# Patient Record
Sex: MALE | Race: WHITE
[De-identification: names, ages, dates, MRNs, and addresses within clinical notes are randomized per-mention and may not be internally consistent; named-entity substitution may affect disease eponyms.]

---

## 2023-05-22 ENCOUNTER — HOSPITAL ENCOUNTER (INPATIENT)
Dept: HOSPITAL 97 - ER | Age: 38
LOS: 1 days | DRG: 296 | End: 2023-05-23
Attending: HOSPITALIST | Admitting: HOSPITALIST
Payer: SELF-PAY

## 2023-05-22 VITALS — BODY MASS INDEX: 26.9 KG/M2

## 2023-05-22 DIAGNOSIS — J81.1: ICD-10-CM

## 2023-05-22 DIAGNOSIS — J96.01: ICD-10-CM

## 2023-05-22 DIAGNOSIS — K72.00: ICD-10-CM

## 2023-05-22 DIAGNOSIS — R68.0: ICD-10-CM

## 2023-05-22 DIAGNOSIS — R57.9: ICD-10-CM

## 2023-05-22 DIAGNOSIS — E87.20: ICD-10-CM

## 2023-05-22 DIAGNOSIS — I45.10: ICD-10-CM

## 2023-05-22 DIAGNOSIS — Z79.899: ICD-10-CM

## 2023-05-22 DIAGNOSIS — J96.02: ICD-10-CM

## 2023-05-22 DIAGNOSIS — I46.9: Primary | ICD-10-CM

## 2023-05-22 DIAGNOSIS — N17.9: ICD-10-CM

## 2023-05-22 LAB
ALBUMIN SERPL BCP-MCNC: 2.5 G/DL (ref 3.4–5)
ALP SERPL-CCNC: 91 U/L (ref 45–117)
ALT SERPL W P-5'-P-CCNC: 734 U/L (ref 16–61)
AST SERPL W P-5'-P-CCNC: 885 U/L (ref 15–37)
BILIRUB INDIRECT SERPL-MCNC: (no result) MG/DL (ref 0.2–0.8)
BUN BLD-MCNC: 28 MG/DL (ref 7–18)
COHGB MFR BLDA: 0 % (ref 0–1.5)
GLUCOSE SERPLBLD-MCNC: 357 MG/DL (ref 74–106)
HCT VFR BLD CALC: 46.8 % (ref 39.6–49)
INR BLD: 1.14
LYMPHOCYTES # SPEC AUTO: 1.4 K/UL (ref 0.7–4.9)
MAGNESIUM SERPL-MCNC: 3.9 MG/DL (ref 1.6–2.4)
MCV RBC: 107.3 FL (ref 80–100)
OXYHGB MFR BLDA: 42.9 % (ref 94–97)
PMV BLD: 9.7 FL (ref 7.6–11.3)
POTASSIUM SERPL-SCNC: 4.1 MEQ/L (ref 3.5–5.1)
RBC # BLD: 4.36 M/UL (ref 4.33–5.43)
SAO2 % BLDA: 43.4 % (ref 92–98.5)
TROPONIN I SERPL HS-MCNC: 29.3 PG/ML (ref ?–58.9)

## 2023-05-22 PROCEDURE — 5A12012 PERFORMANCE OF CARDIAC OUTPUT, SINGLE, MANUAL: ICD-10-PCS

## 2023-05-22 PROCEDURE — 51702 INSERT TEMP BLADDER CATH: CPT

## 2023-05-22 PROCEDURE — 99291 CRITICAL CARE FIRST HOUR: CPT

## 2023-05-22 PROCEDURE — 87205 SMEAR GRAM STAIN: CPT

## 2023-05-22 PROCEDURE — 82805 BLOOD GASES W/O2 SATURATION: CPT

## 2023-05-22 PROCEDURE — 93005 ELECTROCARDIOGRAM TRACING: CPT

## 2023-05-22 PROCEDURE — 92950 HEART/LUNG RESUSCITATION CPR: CPT

## 2023-05-22 PROCEDURE — 71045 X-RAY EXAM CHEST 1 VIEW: CPT

## 2023-05-22 PROCEDURE — 83605 ASSAY OF LACTIC ACID: CPT

## 2023-05-22 PROCEDURE — 84484 ASSAY OF TROPONIN QUANT: CPT

## 2023-05-22 PROCEDURE — 31500 INSERT EMERGENCY AIRWAY: CPT

## 2023-05-22 PROCEDURE — 94002 VENT MGMT INPAT INIT DAY: CPT

## 2023-05-22 PROCEDURE — 80048 BASIC METABOLIC PNL TOTAL CA: CPT

## 2023-05-22 PROCEDURE — 87040 BLOOD CULTURE FOR BACTERIA: CPT

## 2023-05-22 PROCEDURE — 36415 COLL VENOUS BLD VENIPUNCTURE: CPT

## 2023-05-22 PROCEDURE — 83735 ASSAY OF MAGNESIUM: CPT

## 2023-05-22 PROCEDURE — 80076 HEPATIC FUNCTION PANEL: CPT

## 2023-05-22 PROCEDURE — 85025 COMPLETE CBC W/AUTO DIFF WBC: CPT

## 2023-05-22 PROCEDURE — 85610 PROTHROMBIN TIME: CPT

## 2023-05-22 PROCEDURE — 99292 CRITICAL CARE ADDL 30 MIN: CPT

## 2023-05-22 NOTE — RAD REPORT
EXAM DESCRIPTION:  RAD - Chest Single View - 5/22/2023 7:45 pm

 

CLINICAL HISTORY:  cpr

Chest pain.

 

COMPARISON:  No comparisons

 

FINDINGS:  Portable technique limits examination quality.

 

Tip of the endotracheal tube is at the level of the aortic arch. Enteric tube tip is just entering th
e stomach. Right-sided venous catheter its tip in the SVC. No postprocedure pneumothorax.

 

Moderate bilateral pulmonary opacities noted. Sternotomy wires are present. The heart is mildly enlar
ged in size. Nondisplaced fracture evident.
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
## 2023-05-22 NOTE — ER
Nurse's Notes                                                                                     

 [facility]                                                                 

Name: Emre Ramires                                                                                 

Age: 38 yrs                                                                                       

Sex: Male                                                                                         

: 1985                                                                                   

MRN: [id number]                                                                                   

Arrival Date: 2023                                                                          

Time: 18:50                                                                                       

Account#: [id number]                                                                            

Bed 3                                                                                             

Private MD:                                                                                       

Diagnosis: Respiratory failure, hypoxia, acute cardiac arrest, lactic acidosis, acute brain death,

  cerebral hypoxia, multiple organ failure, acute renal failure, shock liver                      

                                                                                                  

Presentation:                                                                                     

                                                                                             

18:51 Chief complaint: EMS states: "pt found on toilet without pulse by girlfriend around     mb9 

      1820. CPR initiated. Gave 2 rounds of Epi in route, 2 Atropine, and 1 Bicarb at 1838.".     

18:51 Coronavirus screen:. Ebola Screen: No symptoms or risks identified at this time.        mb9 

      Initial Sepsis Screen:. Risk Assessment: Do you want to hurt yourself or someone else?      

      Unable to obtain. Onset of symptoms was May 22, 2023.                                       

18:51 Method Of Arrival: EMS: [facility]                                                    mb9 

18:51 Acuity: JAG 1                                                                           mb9 

18:51 Compressions began prior to arrival.                                                    mb9 

18:51 Care prior to arrival: Assisted ventilation, CPR Medication(s) given: 2 Epi, 2          mb9 

      Atropine, and Bicarb.                                                                       

                                                                                                  

Triage Assessment:                                                                                

18:51 General: Behavior is unresponsive.                                                      mb9 

18:51 Respiratory: Airway is compromised. GI: Abdomen is round distended. Derm: Skin is dry,  mb9 

      Skin is pale, Skin temperature is cool.                                                     

                                                                                                  

Historical:                                                                                       

- Allergies:                                                                                      

19:25 No Known Allergies;                                                                     aa5 

19:26 Unable to obtain;                                                                       mb9 

- Home Meds:                                                                                      

19:25 divalproex oral [Active]; oxcarbazepine oral [Active]; Hydroxyzine Oral [Active];       aa5 

      olanzapine oral [Active];                                                                   

19:26 Unable to obtain [Active];                                                              mb9 

- PMHx:                                                                                           

19:25 seizures; Anxiety; hiatal hernia;                                                       aa5 

19:26 Anxiety; Seizure;                                                                       mb9 

- PSHx:                                                                                           

19:26 Unable to Obtain;                                                                       mb9 

                                                                                                  

- Immunization history:: Adult Immunizations unknown.                                             

- Social history:: Smoking status: unknown Patient uses marijuana .                               

                                                                                                  

                                                                                                  

Assessment:                                                                                       

18:42 Reassessment: Rhythm PEA. No pulse present. CPR resumed.                                mb9 

18:44 Reassessment: Rhythm PEA. No pulse present. CPR resumed.                                mb9 

18:46 Reassessment: Rhythm PEA. No central pulse present. CPR resumed.                        mb9 

18:48 Reassessment: Rhythm PEA. No central pulse present. CPR resumed.                        mb9 

18:50 Reassessment: Central pulse palpated. Pulse 62 bpm. Rhythm normal sinus rhythm.         mb9 

19:03 Reassessment: No central pulse present. Rhythm PEA. CPR resumed.                        mb9 

19:05 Reassessment: Rhythm PEA. No central pulse present. CPR resumed.                        mb9 

19:07 Reassessment: Rhythm PEA, No central pulses palpated. CPR resumed.                      mb9 

19:09 Reassessment: Reassessment: Rhythm PEA, No central pulses palpated. CPR resumed.        mb9 

19:11 Reassessment: Central pulse palpated.                                                   mb9 

19:27 Reassessment: Dr. Bal speaking to pt's girlfriend at this time, pt's girlfriend       aa5 

      reports they were here on vacation from [place], pt's girlfriend states "he complained     

      about his stomach hurting and throwing up since yesterday and today he was on the           

      toilet trying to have a bowel movement when all of a sudden we heard a loud thump and       

      found him on the floor having a seizure I think so we called 911". Pt's girlfriend          

      reports she started CPR prior to EMS scene arrival. Reports pt only drinks ETOH             

      occasionally and uses marijuana daily. .                                                    

19:35 General: Behavior is Smells of. Neuro: Level of Consciousness is intubated. Pupils are  mb9 

      fixed, dilated, non-reactive. Cardiovascular: Rhythm is regular. GI: Oral gastric tube      

      to suction. secretions from OG tube reddish/black. Derm: Skin is dry, Skin is pale,         

      Skin temperature is cool.                                                                   

20:55 Reassessment: Girlfriend at bedside. Dr. Atwood speaking to .                      mb9 

21:51 Reassessment: Contacted house supervisor about needed Vasopressin for pt.               mb9 

23:51 Reassessment: No changes from previously documented assessment. Patient and/or family   mb9 

      updated on plan of care and expected duration. Pain level reassessed.                       

                                                                                                  

Vital Signs:                                                                                      

18:43  / 96; Pulse Ox 70% on ETT ambu;                                                  vc1 

18:50 BP 57 / 39; Pulse 62; Resp 24; Pulse Ox 70% ;                                           vc1 

19:00 BP 86 / 54; Pulse 60; Resp 24; Pulse Ox 66% on ETT ambu;                                vc1 

19:03 BP 92 / 68; Pulse Ox 79% on ETT ambu;                                                   vc1 

19:11 BP 65 / 37; Pulse 60; Pulse Ox 75% on ETT ambu;                                         vc1 

19:30 BP 51 / 34; Pulse 62; Resp 18; Temp 89.7(R); Pulse Ox 71% on ETT vent;                  vc1 

19:35 Weight 70 kg; Height 5 ft. 10 in. ;                                                     mb9 

19:45 BP 48 / 39; Pulse 67; Resp 18; Pulse Ox 69% on ETT vent;                                vc1 

20:00 BP 53 / 37; Pulse 65; Resp 18; Pulse Ox 72% on ETT vent;                                vc1 

20:15 BP 60 / 47; Pulse 71; Resp 18; Temp 87.1(Ca); Pulse Ox 77% on ETT vent;                 vc1 

20:30 BP 67 / 53; Pulse 71; Resp 18; Temp 87.1; Pulse Ox 80% on ETT vent;                     vc1 

20:45 BP 63 / 50; Pulse 74; Resp 18; Temp 86.9(Ca); Pulse Ox 80% on ETT vent;                 vc1 

21:00 BP 65 / 55; Pulse 76; Resp 18; Temp 86.8; Pulse Ox 81% on ETT vent;                     vc1 

21:30 BP 70 / 57; Pulse 79; Resp 16; Temp 86.7(Ca); Pulse Ox 81% on ETT vent;                 vc1 

21:45 BP 82 / 63; Pulse 81; Resp 14; Temp 86.6; Pulse Ox 81% on ETT vent;                     vc1 

21:52 BP 82 / 63; Pulse 84; Resp 18; Temp 96.7; Pulse Ox 82% ;                                mb9 

22:04 BP 68 / 57; Pulse 78; Resp 18; Temp 86.7(Ca); Pulse Ox 82% on ETT vent;                 vc1 

22:07 BP 87 / 66; Pulse 87; Resp 18; Temp 86.8; Pulse Ox 81% on ETT vent;                     vc1 

22:15 BP 54 / 65; Pulse 89; Resp 18; Temp 87; Pulse Ox 77% on ETT vent;                       vc1 

22:30 BP 79 / 62; Pulse 93; Resp 18 A; Temp 87.4(Ca); Pulse Ox 74% on ETT vent;               vc1 

23:51 BP 90 / 76; Pulse 104; Resp 18; Pulse Ox 61% on 100% FiO2 ETT vent;                     mb9 

19:35 Body Mass Index 22.14 (70.00 kg, 177.8 cm)                                              mb9 

18:43 PEA on monitor                                                                          vc1 

18:50 ROSC                                                                                    vc1 

19:03 PEA                                                                                     vc1 

19:11 ROSC                                                                                    vc1 

                                                                                                  

[place] Coma Score:                                                                               

22:10 Eye Response: none(1). Motor Response: none(1). Verbal Response: none(1). Total: 3.     sp4 

                                                                                                  

ED Course:                                                                                        

18:45 Arm band placed on.                                                                     mb9 

18:46 Intubation: Ventilated with 100% bag valve mask (BVM) prior to procedure. 7.5 Fr. ETT   mb9 

      placed orally. Performed by Keegan Bal MD Successful on first attempt. Placement           

      verified by auscultating bilateral breath sounds, Ventilated with Ambu bag. 23 cm at        

      teeth.                                                                                      

18:50 Inserted saline lock: 20 gauge in right forearm, using aseptic technique.               mb9 

18:51 Patient arrived in ED.                                                                  aa5 

18:56 Marie cath inserted, using sterile technique, 16 Fr., by ED staff, balloon inflated, to mb9 

      gravity drainage.                                                                           

18:56 14 fr OG tube placed.                                                                   mb9 

19:00 Assisted provider with central line placement. Set up central line tray. Triple lumen   mb9 

      line placed in right internal jugular. Line placed by Keegan Bal MD Placement verified     

      by blood return, Dressed with Tegaderm, Blood was collected.                                

19:17 Samia Horavth, RN is Primary Nurse.                                               mb9 

19:25 Keegan Bal MD is Attending Physician.                                                sp3 

19:26 Triage completed.                                                                       mb9 

19:47 XRAY Chest (1 view) In Process Unspecified.                                             EDMS

20:06 Attending Physician role handed off by Keegan Bal MD                                 sp4 

20:06 Ruben Atwood MD is Attending Physician.                                           sp4 

22:17 Derek Chung MD is Hospitalizing Provider.                                           sp4 

                                                                                                  

Administered Medications:                                                                         

18:43 Drug: EPINEPHrine 1:10,000 IVP 0.1 mg/kg Route: IVP; Site: right antecubital;           mb9 

18:44 Drug: Sodium Bicarbonate IVP 1 amp Route: IVP; Site: right antecubital;                 mb9 

18:45 Drug: D50W IVP 50 ml Route: IVP; Site: right antecubital;                               mb9 

18:45 Drug: Naloxone IVP 2 mg Route: IVP; Site: right antecubital;                            mb9 

18:47 Drug: EPINEPHrine 1:10,000 IVP 1 mg Route: IVP; Site: right antecubital;                mb9 

18:50 Drug: Sodium Bicarbonate IVP 1 amp Route: IVP; Site: right antecubital;                 mb9 

18:57 Drug: EPINEPHrine 1:10,000 IVP 1 mg Route: IVP; Site: right antecubital;                mb9 

19:04 Drug: EPINEPHrine 1:10,000 IVP 1 mg Route: IVP; Site: right jugular;                    mb9 

19:08 Drug: Calcium Chloride IVP 1 grams Route: IVP; Site: right forearm;                     mb9 

19:09 Drug: Sodium Bicarbonate IVP 1 amp Route: IVP; Site: right forearm;                     mb9 

19:11 Drug: Calcium Chloride IVP 1 grams Route: IVP; Site: right forearm;                     mb9 

19:12 Drug: Sodium Bicarbonate IVP 1 amp Route: IVP; Site: right forearm;                     mb9 

19:15 Drug: Norepinephrine IV 0.1 mcg/kg/min Route: IV; Rate: calculated rate; Site: right    mb9 

      jugular;                                                                                    

19:23 Follow up: Response: No adverse reaction; Rate change 30 mcg/kg/min                     mb9 

21:37 Follow up: Response: No adverse reaction; IV Status: Completed infusion                 mb9 

19:39 CANCELLED (Other Intervention Used): EPINEPHrine 1:10,000 Endotracheal 1 mg             mb9 

      Endotracheal once                                                                           

19:50 Drug: DOPamine 0.5 mcg/kg/min Route: IV; Rate: calculated rate; Site: right forearm;    mb9 

21:37 Follow up: Response: No adverse reaction; IV Status: Completed infusion                 mb9 

21:41 Follow up: Response: No adverse reaction; IV Status: Completed infusion                 mb9 

20:07 CANCELLED (Other Intervention Used): DOBUTamine IV (500 mg/250mL premix) 2.5 mcg/kg/min kl  

      IV at calculated rate in right jugular See Administration Instructions; Recommended max     

      rate 20 mcg/kg/min; Titrate 2.5 mcg/kg/min as often as every 5 minutes to achieve goal      

      (see titration policy); Goal parameter MAP greater than 65 mmHg.                            

21:38 Drug: Norepinephrine IV 0.1 mcg/kg/min Route: IV; Rate: calculated rate; Site: right    mb9 

      jugular;                                                                                    

23:41 Follow up: Increase rate to 45 mcg/kg/min per Dr. Bradshaw                             mb9 

21:39 Drug: DOPamine 0.5 mcg/kg/min Route: IV; Rate: mcg/kg/min; Site: right jugular;         mb9 

21:51 Drug: Sodium Bicarbonate IVP 2 amp Route: IVP; Site: right antecubital;                 mb9 

23:00 Drug: Albumin IVPB 25 grams Volume: 100 ml; Route: IVPB; Site: right forearm;           mb9 

23:05 Drug: Vasopressin IV 0.02 units/min Route: IV; Rate: calculated rate; Site: right       mb9 

      jugular;                                                                                    

                                                                                                  

                                                                                                  

Intake:                                                                                           

                                                                                                  

Outcome:                                                                                          

22:18 Decision to Hospitalize by Provider.                                                    sp4 

23:52 Admitted to ICU accompanied by nurse, room 6, with oxygen, on monitor, with chart,      mb9 

      Report called to  HADLEY Pizano                                                               

23:52 Condition: stable                                                                           

23:52 Instructed on the need for admit.                                                           

                                                                                             

00:18 Patient left the ED.                                                                    vc1 

                                                                                                  

Signatures:                                                                                       

Dispatcher [employer]                           EDMS                                                 

Deborah Miller RN                     RN   aa5                                                  

Keegan Bal MD MD   sp3                                                  

Melyssa Yun RN RN   vc1                                                  

Samia Horvath RN RN   mb9                                                  

Ruben Atwood MD MD   sp4                                                  

Joleen King RN   kl                                                   

                                                                                                  

Corrections: (The following items were deleted from the chart)                                    

                                                                                             

19:39 18:47 EPINEPHrine 1:10,000 Endotracheal 1:10,000 1 mg Endotracheal mb9                  mb9 

20:06 18:58 DOBUTamine IV (500 mg/250mL premix) 2.5 mcg/kg/min IV at calculated rate in right kl  

      jugular mb9                                                                                 

21:02 18:45  / 96; Pulse 74bpm; Pulse Ox 70% ambu bag; vc1                              vc1 

21:03 18:50  / 96; Pulse Ox 70% ambu bag; PEA on monitor; vc1                           vc1 

21:05 19:00 BP 86 / 54; Pulse 115bpm; Resp 24bpm; Pulse Ox 66%; vc1                           vc1 

21:05 18:43  / 96; Pulse Ox 70% ambu bag; PEA on monitor; vc1                           vc1 

21:12 18:50 BP 57 / 39; Pulse 62bpm; Resp 24bpm; Pulse Ox 70%; vc1                            vc1 

                                                                                                  

**************************************************************************************************

## 2023-05-22 NOTE — EDPHYS
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                                                                     sp4 

      Problem: new                                                                            sp4 

      Symptoms: are unchanged                                                                 sp4 

      Bed/Room Type: Standard                                                                 sp4 

      Room Assignment: 6-(23 22:50)                                                     mw  

      Diagnosis                                                                                   

        - Respiratory failure, hypoxia, acute cardiac arrest, lactic acidosis, acute brain    sp4 

      death, cerebral hypoxia, multiple organ failure, acute renal failure, shock liver           

      Forms:                                                                                      

        - Medication Reconciliation Form                                                      sp4 

        - SBAR form                                                                           sp4 

Critical care time excluding procedures:                                                          

19:54 Critical care time: Bedside Care: 60 minutes, Consultation: 15 minutes, Family          sp3 

      Intervention: 15 minutes. Total time: 90 minutes                                            

                                                                                                  

Signatures:                                                                                       

Dispatcher [employer]                           EDJoleen Kincaid RN                     Ana Metz RN                        RN   Deborah Calhoun RN                     RN   [id number]                                                  

Armand Do, FNP-C                      FNP-Cla1                                                  

Keegan Bal MD MD   sp3                                                  

Samia Horvath RN RN   mb9                                                  

Ruben Atwood MD MD   sp4                                                  

                                                                                                  

Corrections: (The following items were deleted from the chart)                                    

19:39 19:38 EPINEPHrine 1:10,000 Endotracheal 1:10,000 1 mg Endotracheal once given. mb9      mb9 

19:39 19:39 EPINEPHrine 1:10,000 Endotracheal 1:10,000 1 mg Endotracheal once ordered. mb9    mb9 

20:07 19:50 DOBUTamine IV (500 mg/250mL premix) 2.5 mcg/kg/min IV at calculated rate in right kl  

      jugular See Administration Instructions; Recommended max rate 20 mcg/kg/min; Titrate        

      2.5 mcg/kg/min as often as every 5 minutes to achieve goal (see titration policy); Goal     

      parameter MAP greater than 65 mmHg. given. mb9                                              

20:07 20:06 DOBUTamine IV (500 mg/250mL premix) 2.5 mcg/kg/min IV at calculated rate in right kl  

      jugular See Administration Instructions; Recommended max rate 20 mcg/kg/min; Titrate        

      2.5 mcg/kg/min as often as every 5 minutes to achieve goal (see titration policy); Goal     

      parameter MAP greater than 65 mmHg. ordered.                                              

21:11 18:58 Head C Spine MPR Wo Con+CT.RAD.BRZ ordered. EDMS                                  EDMS

22:17 22:12 ED course: Patient presented after acute cardiac arrest in the field, ROSC        sp4 

      obtained in the ER,. sp4                                                                    

22:50 22:18 sp4                                                                               mw  

                                                                                                  

**************************************************************************************************

## 2023-05-23 VITALS — OXYGEN SATURATION: 61 % | SYSTOLIC BLOOD PRESSURE: 90 MMHG | DIASTOLIC BLOOD PRESSURE: 76 MMHG

## 2023-05-23 VITALS — TEMPERATURE: 87.4 F

## 2023-05-23 PROCEDURE — 5A1935Z RESPIRATORY VENTILATION, LESS THAN 24 CONSECUTIVE HOURS: ICD-10-PCS

## 2023-05-23 PROCEDURE — 0BH17EZ INSERTION OF ENDOTRACHEAL AIRWAY INTO TRACHEA, VIA NATURAL OR ARTIFICIAL OPENING: ICD-10-PCS
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
## 2023-05-24 NOTE — EKG
Test Date:    2023-05-22               Test Time:    22:27:31

Technician:   SAMMY                                   

                                                     

MEASUREMENT RESULTS:                                       

Intervals:                                           

Rate:         91                                     

LA:           198                                    

QRSD:         146                                    

QT:           418                                    

QTc:          514                                    

Axis:                                                

P:            64                                     

LA:           198                                    

QRS:          29                                     

T:            107                                    

                                                     

INTERPRETIVE STATEMENTS:                                       

                                                     

Normal sinus rhythm

Nonspecific intraventricular block

Abnormal ECG

No previous ECG available for comparison



Electronically Signed On 05-24-23 04:54:21 CDT by Mac Velasco